# Patient Record
Sex: FEMALE
[De-identification: names, ages, dates, MRNs, and addresses within clinical notes are randomized per-mention and may not be internally consistent; named-entity substitution may affect disease eponyms.]

---

## 2017-05-23 ENCOUNTER — HOSPITAL ENCOUNTER (OUTPATIENT)
Dept: HOSPITAL 31 - C.ENDO | Age: 52
Discharge: HOME | End: 2017-05-23
Attending: SPECIALIST
Payer: COMMERCIAL

## 2017-05-23 VITALS
SYSTOLIC BLOOD PRESSURE: 131 MMHG | RESPIRATION RATE: 17 BRPM | DIASTOLIC BLOOD PRESSURE: 75 MMHG | OXYGEN SATURATION: 100 % | HEART RATE: 57 BPM

## 2017-05-23 VITALS — TEMPERATURE: 97.1 F

## 2017-05-23 VITALS — BODY MASS INDEX: 50.5 KG/M2

## 2017-05-23 DIAGNOSIS — K25.9: Primary | ICD-10-CM

## 2017-05-23 DIAGNOSIS — R10.13: ICD-10-CM

## 2017-05-23 DIAGNOSIS — Z88.0: ICD-10-CM

## 2017-05-23 DIAGNOSIS — K29.60: ICD-10-CM

## 2017-05-23 DIAGNOSIS — I10: ICD-10-CM

## 2017-05-23 PROCEDURE — 88305 TISSUE EXAM BY PATHOLOGIST: CPT

## 2017-05-23 PROCEDURE — 43239 EGD BIOPSY SINGLE/MULTIPLE: CPT

## 2017-05-23 PROCEDURE — 88342 IMHCHEM/IMCYTCHM 1ST ANTB: CPT

## 2018-01-15 ENCOUNTER — HOSPITAL ENCOUNTER (EMERGENCY)
Dept: HOSPITAL 42 - ED | Age: 53
Discharge: HOME | End: 2018-01-15
Payer: COMMERCIAL

## 2018-01-15 VITALS — SYSTOLIC BLOOD PRESSURE: 158 MMHG | DIASTOLIC BLOOD PRESSURE: 94 MMHG | HEART RATE: 61 BPM

## 2018-01-15 VITALS — RESPIRATION RATE: 18 BRPM

## 2018-01-15 VITALS — TEMPERATURE: 97.6 F | OXYGEN SATURATION: 100 %

## 2018-01-15 VITALS — BODY MASS INDEX: 50.5 KG/M2

## 2018-01-15 DIAGNOSIS — I25.2: ICD-10-CM

## 2018-01-15 DIAGNOSIS — Z87.891: ICD-10-CM

## 2018-01-15 DIAGNOSIS — I10: ICD-10-CM

## 2018-01-15 DIAGNOSIS — K21.9: ICD-10-CM

## 2018-01-15 DIAGNOSIS — R07.9: Primary | ICD-10-CM

## 2018-01-15 LAB
ALBUMIN SERPL-MCNC: 3.8 G/DL (ref 3–4.8)
ALBUMIN/GLOB SERPL: 1.1 {RATIO} (ref 1.1–1.8)
ALT SERPL-CCNC: 50 U/L (ref 7–56)
AST SERPL-CCNC: 33 U/L (ref 14–36)
BASOPHILS # BLD AUTO: 0.04 K/MM3 (ref 0–2)
BASOPHILS NFR BLD: 0.6 % (ref 0–3)
BNP SERPL-MCNC: 44.3 PG/ML (ref 0–450)
BUN SERPL-MCNC: 16 MG/DL (ref 7–21)
CALCIUM SERPL-MCNC: 9.3 MG/DL (ref 8.4–10.5)
EOSINOPHIL # BLD: 0.2 10*3/UL (ref 0–0.7)
EOSINOPHIL NFR BLD: 2.8 % (ref 1.5–5)
ERYTHROCYTE [DISTWIDTH] IN BLOOD BY AUTOMATED COUNT: 14 % (ref 11.5–14.5)
GFR NON-AFRICAN AMERICAN: > 60
GRANULOCYTES # BLD: 4.56 10*3/UL (ref 1.4–6.5)
GRANULOCYTES NFR BLD: 62.7 % (ref 50–68)
HGB BLD-MCNC: 13.9 G/DL (ref 12–16)
LYMPHOCYTES # BLD: 1.9 10*3/UL (ref 1.2–3.4)
LYMPHOCYTES NFR BLD AUTO: 26.1 % (ref 22–35)
MCH RBC QN AUTO: 29.4 PG (ref 25–35)
MCHC RBC AUTO-ENTMCNC: 32.1 G/DL (ref 31–37)
MCV RBC AUTO: 91.7 FL (ref 80–105)
MONOCYTES # BLD AUTO: 0.6 10*3/UL (ref 0.1–0.6)
MONOCYTES NFR BLD: 7.8 % (ref 1–6)
PLATELET # BLD: 219 10^3/UL (ref 120–450)
PMV BLD AUTO: 10.5 FL (ref 7–11)
RBC # BLD AUTO: 4.72 10^6/UL (ref 3.5–6.1)
TROPONIN I SERPL-MCNC: < 0.01 NG/ML
TROPONIN I SERPL-MCNC: < 0.01 NG/ML
WBC # BLD AUTO: 7.3 10^3/UL (ref 4.5–11)

## 2018-01-15 NOTE — CARD
--------------- APPROVED REPORT --------------





EKG Measurement

Heart [id number]

FL [id number]

[id number]

[id number]

[id number]



<Conclusion>

Normal sinus rhythm

Anteroseptal MI, age unknown

Small q waves 3,F

## 2018-01-15 NOTE — RAD
HISTORY:

chest pain  



COMPARISON:

No prior. 



FINDINGS:



LUNGS:

No active pulmonary disease.



PLEURA:

No significant pleural effusion identified, no pneumothorax apparent.



CARDIOVASCULAR:

Mild cardiomegaly.  Minimal vascular congestion



OSSEOUS STRUCTURES:

No significant abnormalities.



VISUALIZED UPPER ABDOMEN:

Normal.



OTHER FINDINGS:

None.



IMPRESSION:

Minimal vascular congestion

## 2018-01-15 NOTE — ED PDOC
Arrival/HPI





<Huber Perez - Last Filed: 01/15/18 15:12>





- General


Historian: Patient





- History of Present Illness


Symptom Onset: Sudden


Symptom Course: Resolved





<Vilma Rodriguez - Last Filed: 01/15/18 15:25>





- General


Time Seen by Provider: 01/15/18 09:56





- History of Present Illness


Narrative History of Present Illness (Text): 





01/15/18 10:02


52F presents from a rapid response in the hospital to the ER for chest pain at 

the [facility]. Patient states [place] [facility] [place] her heart rate was in the 30s due to fear of her decompensating further 

during biopsy of her uterine lining. Patient states she started feeling 

pressure in her chest on chest bilaterally. Patient states she's been feeling 

more fatigue within the past few weeks and tries to avoid walking now. Patient 

states she sleeps with a lot of pillows. Patient sees Dr. Sanchez and said she had 

about a 50-60% occlusion in her last exam, but it has been awhile since she has 

been re-evaluated. Patient was told to keep nitroglycerin with her when she 

experiences chest pain. Patient 





Patient admits to dysuria, polydipsia, no hematuria





Family history: mother and father  of heart attack, brother  at 25 of MI

, neice  at 3 years old. Mother's side of family has heart disease history 

and history of cancers (leukemia, ovarian)


PMH: CAD 


PSH: skin biopsies and recent biopsy of uterus


Social history: patient is a former smoker, no alcohol, no illicit drugs


Allergies: Penicillins, shrimp, roaches, trees


01/15/18 10:43





01/15/18 12:41


 (Vilma Rodriguez)





Past Medical History





- Tetanus Immunization


Tetanus Immunization: Unknown





- Cardiac


Hx Cardiac Disorders: Yes


Hx Circulatory Problems: Yes (" 1 CARDIAC BLOCKAGE POST ANGIOGRAM; NO 

INTERVENTION )


Hx MI: Yes (AGE 37 DUE TO HIGH BP)


Hx Hypertension: Yes





- Pulmonary


Hx Respiratory Disorders: Yes


Hx Asthma: Yes


Hx Chronic Obstructive Pulmonary Disease (COPD): Yes


Hx Sleep Apnea: Yes (USES C PAP)





- Neurological


Hx Neurological Disorder: Yes


Hx Dizziness: Yes


Hx Migraine: Yes





- HEENT


Hx HEENT Disorder: No





- Renal


Hx Renal Disorder: No





- Endocrine/Metabolic


Hx Endocrine Disorders: No





- Hematological/Oncological


Hx Blood Disorders: No





- Integumentary


Hx Dermatological Disorder: No





- Musculoskeletal/Rheumatological


Hx Musculoskeletal Disorders: Yes


Hx Arthritis: Yes


Hx Back Pain: Yes





- Gastrointestinal


Hx Gastrointestinal Disorders: Yes


Hx Fatty Liver Disease: Yes


Hx Gastritis: Yes


Hx Gastroesophageal Reflux: Yes





- Genitourinary/Gynecological


Hx Genitourinary Disorders: No





- Psychiatric


Hx Psychophysiologic Disorder: Yes


Hx Anxiety: Yes





- Surgical History


Hx Angiogram: Yes (2X)


Hx Tubal Ligation: Yes


Other/Comment: REMOVAL OF LIPOMA BACK AND WAIST





- Anesthesia


Hx Anesthesia: Yes


Hx Anesthesia Reactions: No


Hx Malignant Hyperthermia: No





- Suicidal Assessment


Feels Threatened In Home Enviroment: No





<Vilma Rodriguez - Last Filed: 01/15/18 15:25>





Family/Social History





- Physician Review


Nursing Documentation Reviewed: Yes


Smoking Status: Former Smoker


Hx Alcohol Use: No


Hx Substance Use Treatment: No





<Huber Perez - Last Filed: 01/15/18 15:12>


Family/Social History: CAD/MI, Neoplasm/Cancer


Smoking Status: Former Smoker


Hx Alcohol Use: No


Hx Substance Use Treatment: No





<Vilma Rodriguez - Last Filed: 01/15/18 15:25>


Narrative Family History (Free Text): 





01/15/18 10:22


father, mother and brother  of MI


Mother's side of family has cancer (leukemia, ovarian) (Vilma Rodriguez)





Allergies/Home Meds





<Huber Perez - Last Filed: 01/15/18 15:12>





<Vilma Rodriguez - Last Filed: 01/15/18 15:25>


Allergies/Adverse Reactions: 


Allergies





Penicillins Allergy (Severe, Verified 01/15/18 09:54)


 SHORTNESS OF BREATH


shrimp Allergy (Severe, Verified 01/15/18 09:54)


 SHORTNESS OF BREATH


ROACHES Allergy (Intermediate, Uncoded 01/15/18 09:54)


 ITCHING


TREES Allergy (Intermediate, Uncoded 01/15/18 09:54)


 ITCHING








Home Medications: 


 Home Meds











 Medication  Instructions  Recorded  Confirmed


 


Albuterol HFA [Ventolin HFA 90 2 puff IH BID PRN 09/20/16 01/15/18





mcg/actuation (8 g)]   


 


Furosemide [Lasix] 20 mg PO DAILY 09/20/16 01/15/18


 


Losartan/Hydrochlorothiazide 1 each PO DAILY 09/20/16 01/15/18





[Losartan-Hctz 100-25 mg Tab]   


 


Metoprolol Tartrate [Lopressor] 50 mg PO DAILY 09/20/16 01/15/18


 


Naproxen 500 mg PO BID 09/20/16 01/15/18


 


Warfarin Sodium [Jantoven] 5 mg PO DAILY 09/20/16 01/15/18














Review of Systems





- Physician Review


All systems were reviewed & negative as marked: Yes





- Review of Systems


Constitutional: Fatigue.  absent: Weight Change, Fevers


Eyes: absent: Vision Changes, Photophobia


ENT: absent: Hearing Changes, Rhinorrhea, Epistaxis


Respiratory: absent: SOB, Cough, Sputum


Cardiovascular: Orthopnea, Other (chest pressure)


Gastrointestinal: Normal.  absent: Constipation, Diarrhea


Genitourinary Female: Frequency.  absent: Hematuria, Vaginal Bleeding


Musculoskeletal: Arthralgias.  absent: Back Pain, Neck Pain


Skin: Other (spider bit wound on dorsum of right foot)


Neurological: absent: Headache, Dizziness, Speech Changes, Facial Droop


Endocrine: Other (denies having period for 5 years)


Hemo/Lymphatic: Adenopathy


Psychiatric: Normal.  absent: Anxiety, Depression





<Eng,Vilma - Last Filed: 01/15/18 15:25>





Physical Exam


Temperature: Afebrile


Blood Pressure: Hypertensive


Pulse: Bradycardic


Respiratory Rate: Normal


Appearance: Positive for: Non-Toxic, Other (lethargic)





- Systems Exam


Head: Present: Atraumatic, Normocephalic


Pupils: Present: PERRL


Extroacular Muscles: Present: EOMI


Conjunctiva: Present: Normal


Mouth: Present: Moist Mucous Membranes.  No: Dry, Drooling


Nose (External): Present: Atraumatic.  No: Abrasion


Nose (Internal): Present: Normal Inspection.  No: No Active Bleeding, Rhinorrhea


Neck: Present: Normal Range of Motion, Trachea Midline.  No: JVD


Respiratory/Chest: Present: Clear to Auscultation, Good Air Exchange.  No: 

Respiratory Distress


Cardiovascular: Present: Regular Rate and Rhythm, Normal S1, S2


Abdomen: Present: Distention.  No: Rebound, Guarding


Upper Extremity: Present: Capillary Refill < 2s.  No: Edema


Lower Extremity: Present: Edema (1+ trace pitting edema), Other (right lower 

extremity in boot. patient had a spider bite that hasn't healed).  No: CALF 

TENDERNESS


Neurological: Present: GCS=15, Speech Normal


Skin: Present: Warm, Dry, Normal Color.  No: Rashes


Psychiatric: Present: Alert, Oriented x 3





<Vilma Rodriguez - Last Filed: 01/15/18 15:25>


Vital Signs











  Temp Pulse Resp BP Pulse Ox


 


 01/15/18 12:53   61  18  158/94 H  100


 


 01/15/18 11:58   58 L  18  148/89  100


 


 01/15/18 10:06  97.6 F  57 L  16  156/90 H  100














Medical Decision Making





- RAD Interpretation


: ED Physician





<Huber Perez - Last Filed: 01/15/18 15:12>


Re-evaluation Time: 15:00


Reassessment Condition: Re-examined, Improved





- EKG Interpretation


Type: 12 lead EKG





<Vilma Rodriguez - Last Filed: 01/15/18 15:25>


ED Course and Treatment: 








01/15/18 10:44


Seen and examined with the resident. Our history and physical exam reveals a 

morbidly obese  female who complains of chest pain while in the wound 

Center this morning. She is currently comfortable. She reports a cardiac 

catheterization at another hospital 3 years ago. Unclear of the findings. She 

had a uterine biopsy last week under general anesthesia. She quit smoking 20 

years ago. No dyspnea. No nausea or vomiting.


01/15/18 12:24


Patient remains pain free. Plan will be to repeat a second set of cardiac 

enzymes and if troponin is negative to discharge patient home. Patient and 

daughter are aware of and agrees with plan.


01/15/18 12:42


EKG shows normal sinus rhythm rate approximately 60 with poor R waves and no 

acute ST or T-wave changes


01/15/18 15:13


Second troponin is negative. Patient will be discharged home accompanied by her 

daughter to follow up with her PMD. Follow-up in the ER as needed. Continue 

daily baby aspirin. (Huber Perez)





ACS/CHF


BNP


EKG


Echo


Troponin I


Cardio Consult: Dr. Sanchez





urinary problems


UA





spider bite wound that hasn't healed


Wound care (Vilma Rodriguez)





- Lab Interpretations


Lab Results: 








 01/15/18 10:30 





 01/15/18 10:30 





 Lab Results





01/15/18 14:30: Lactate Dehydrogenase 547, Total Creatine Kinase 63, Troponin I 

< 0.01


01/15/18 10:30: WBC 7.3, RBC 4.72, Hgb 13.9, Hct 43.3, MCV 91.7, MCH 29.4, MCHC 

32.1, RDW 14.0, Plt Count 219, MPV 10.5, Gran % 62.7, Lymph % (Auto) 26.1, Mono 

% (Auto) 7.8 H, Eos % (Auto) 2.8, Baso % (Auto) 0.6, Gran # 4.56, Lymph # 1.9, 

Mono # 0.6, Eos # 0.2, Baso # 0.04


01/15/18 10:30: Sodium 139, Potassium 4.5, Chloride 104, Carbon Dioxide 27, 

Anion Gap 12, BUN 16, Creatinine 0.7, Est GFR (African Amer) > 60, Est GFR (Non-

Af Amer) > 60, Random Glucose 109, Calcium 9.3, Total Bilirubin 0.6, AST 33, 

ALT 50, Alkaline Phosphatase 100, Lactate Dehydrogenase 577, Total Creatine 

Kinase 65, Troponin I < 0.01, NT-Pro-B Natriuret Pep 44.3, Total Protein 7.2, 

Albumin 3.8, Globulin 3.4, Albumin/Globulin Ratio 1.1











- RAD Interpretation


Radiology Orders: 








01/15/18 10:37


CHEST PORTABLE [RAD] Stat 








Chest one view shows borderline cardiomegaly and increased markings with no 

infiltrate and no effusion (Huber Perez)





Disposition/Present on Arrival





- Present on Arrival


Any Indicators Present on Arrival: No


History of DVT/PE: No


History of Uncontrolled Diabetes: No


Urinary Catheter: No


History of Decub. Ulcer: No





- Disposition


Have Diagnosis and Disposition been Completed?: Yes


Disposition Time: 15:14


Patient Plan: Discharge





<Huber Perez - Last Filed: 01/15/18 15:12>





- Present on Arrival


Any Indicators Present on Arrival: No


History of DVT/PE: No


History of Uncontrolled Diabetes: No


Urinary Catheter: No


History Surgical Site Infection Followin





- Disposition


Have Diagnosis and Disposition been Completed?: Yes





<Vilma Rodriguez - Last Filed: 01/15/18 15:25>





- Disposition


Diagnosis: 


 Chest pain





Disposition: HOME/ ROUTINE


Patient Problems: 


 Current Active Problems











Problem Status Onset


 


CAD (coronary artery disease) Acute  


 


Chest pain Acute  











Condition: GOOD


Discharge Instructions (ExitCare):  Chest Pain (ED)


Additional Instructions: 


Daily baby aspirin. Follow-up with PMD. Follow-up in the ER as needed.


Referrals: 


Zeb Reagan MD [Primary Care Provider] - Follow up with primary

## 2018-02-08 ENCOUNTER — HOSPITAL ENCOUNTER (OUTPATIENT)
Dept: HOSPITAL 31 - C.ENDO | Age: 53
Discharge: HOME | End: 2018-02-08
Attending: SPECIALIST
Payer: COMMERCIAL

## 2018-02-08 VITALS — RESPIRATION RATE: 16 BRPM

## 2018-02-08 VITALS — SYSTOLIC BLOOD PRESSURE: 129 MMHG | DIASTOLIC BLOOD PRESSURE: 77 MMHG | HEART RATE: 68 BPM

## 2018-02-08 VITALS — OXYGEN SATURATION: 100 %

## 2018-02-08 VITALS — BODY MASS INDEX: 50.5 KG/M2

## 2018-02-08 VITALS — TEMPERATURE: 97 F

## 2018-02-08 DIAGNOSIS — R10.13: ICD-10-CM

## 2018-02-08 DIAGNOSIS — K25.9: ICD-10-CM

## 2018-02-08 DIAGNOSIS — K44.9: ICD-10-CM

## 2018-02-08 DIAGNOSIS — K29.60: Primary | ICD-10-CM

## 2018-02-08 DIAGNOSIS — R11.2: ICD-10-CM

## 2018-02-08 PROCEDURE — 43239 EGD BIOPSY SINGLE/MULTIPLE: CPT

## 2018-02-08 PROCEDURE — 88305 TISSUE EXAM BY PATHOLOGIST: CPT

## 2018-02-13 ENCOUNTER — HOSPITAL ENCOUNTER (OUTPATIENT)
Dept: HOSPITAL 31 - C.ENDO | Age: 53
Discharge: HOME | End: 2018-02-13
Attending: SPECIALIST
Payer: COMMERCIAL

## 2018-02-13 VITALS — TEMPERATURE: 97.2 F

## 2018-02-13 VITALS — RESPIRATION RATE: 18 BRPM

## 2018-02-13 VITALS — BODY MASS INDEX: 50.5 KG/M2

## 2018-02-13 VITALS — OXYGEN SATURATION: 100 %

## 2018-02-13 VITALS — SYSTOLIC BLOOD PRESSURE: 150 MMHG | DIASTOLIC BLOOD PRESSURE: 74 MMHG | HEART RATE: 53 BPM

## 2018-02-13 DIAGNOSIS — E11.9: ICD-10-CM

## 2018-02-13 DIAGNOSIS — I25.10: ICD-10-CM

## 2018-02-13 DIAGNOSIS — K52.9: ICD-10-CM

## 2018-02-13 DIAGNOSIS — K64.8: ICD-10-CM

## 2018-02-13 DIAGNOSIS — I10: ICD-10-CM

## 2018-02-13 DIAGNOSIS — Z88.0: ICD-10-CM

## 2018-02-13 DIAGNOSIS — J45.909: ICD-10-CM

## 2018-02-13 DIAGNOSIS — K58.9: Primary | ICD-10-CM

## 2018-02-13 DIAGNOSIS — K64.4: ICD-10-CM

## 2018-02-13 PROCEDURE — 88305 TISSUE EXAM BY PATHOLOGIST: CPT

## 2018-02-13 PROCEDURE — 45378 DIAGNOSTIC COLONOSCOPY: CPT

## 2018-08-07 NOTE — PCM.RRT
<JessieBrett - Last Filed: 01/15/18 10:02>





RRT Nurse Assessment





- Situation


Date: 01/15/18


Time RRT was called: 09:27


RRT Responder Arrival Time: 09:29


RRT Location:: [facility]


RRT Reason for Call: Chest Pain





- Respiratory


Oxygen Delivery Method: Nasal Cannula @L/min (2L)





I.Reason for RRT





- A) Acute Change in Patient:


(Select all that apply): Chest Pain


Subjective: 





House Physician Resident


Brett Roman, PGY-2 IM





Rapid response called at 0927, responded immediately.  On arrival to [facility], staff reports patient acutely developed left sided chest pain, 

shortness of breath, and complained of sensation of "elephant sitting on my 

chest."  Portable monitor leads were attached to patient, no ST-elevations 

evident on monitor.  Patient vitals indicated hemodynamic stability, satting 

well on room air, and not grossly tachycardic or bradycardic.  Patient was 

transferred to stretcher, placed on 2L NC O2, and transferred to ED via 

stretcher with portable monitor still attached.  Stable throughout transport, 

and pt reported some improvement in chest pain during transit.  Upon arrival in 

ED, signed out to attending ED physician and ED resident.  Patient admitted to 

ED for EKG, trops, and additional workup to rule out acute coronary syndrome.





Patient seen, reviewed, and discussed with hospitalist attending responding to 

rapid response, Dr. Blair.





- Neurological Status


(Select all that apply): Alert





- Respiratory


Oxygen Delivery Method: Nasal Cannula @L/min (2L)





- Constitutional


Appears: Well, Non-toxic, In Acute Distress (chest pain and uncomfotable 

appearing)





- Head


Head Exam: ATRAUMATIC, NORMAL INSPECTION, NORMOCEPHALIC





- Eyes


Eye Exam: Normal appearance.  absent: Conjunctival injection, Scleral icterus





- Respiratory Exam


Respiratory Exam: NORMAL BREATHING PATTERN.  absent: Accessory Muscle Use, 

Decreased Breath Sounds, Respiratory Distress





- Cardiovascular Exam


Cardiovascular Exam: REGULAR RHYTHM, RRR.  absent: Bradycardia, Tachycardia, 

Irregular Rhythm


Additional comments: 





No ST-elevations on portable monitor





- GI/Abdominal Exam


GI & Abdominal Exam: absent: Distended (obese but not distended)





- Neurological Exam


Neurological Exam: Alert, Awake





- Extremities Exam


Extremities Exam: Full ROM


Additional comments: 





transferred from wound center chair to stretcher without overt gait abnormality

, used 2-person assist





Plan





- Assessment of Findings&Treatment Plan





Hemodynamically stable, satting well on room air, stable for transfer to ED for 

further ACS workup, including EKG and trops


Signed out to ED attending physician and ED resident.





<Den Blair - Last Filed: 01/15/18 15:41>





Attending/Attestation





- Attestation


I have personally seen and examined this patient.: Yes


I have fully participated in the care of the patient.: Yes


I have reviewed all pertinent clinical information, including history, physical 

exam and plan: Yes


Notes (Text): 





01/15/18 15:39


attending note;





Patient seen and examined with resident during rapid response.





patient is a 52-year-old [facility] developed chest pain.


 Patient  is hemodynamically stable.


Transfer to the ER for further  work up.


Signed out to ER attending Dr. Hall.





the patient will get EKG, cardiac enzymes, CBC CMP in the ER. blood in urine/urinary symptoms

## 2018-10-11 ENCOUNTER — HOSPITAL ENCOUNTER (OUTPATIENT)
Dept: HOSPITAL 31 - C.ENDO | Age: 53
Discharge: HOME | End: 2018-10-11
Attending: SPECIALIST
Payer: COMMERCIAL

## 2018-10-11 VITALS — HEART RATE: 58 BPM | RESPIRATION RATE: 15 BRPM

## 2018-10-11 VITALS — SYSTOLIC BLOOD PRESSURE: 127 MMHG | DIASTOLIC BLOOD PRESSURE: 69 MMHG

## 2018-10-11 VITALS — TEMPERATURE: 97 F | OXYGEN SATURATION: 100 %

## 2018-10-11 VITALS — BODY MASS INDEX: 54.6 KG/M2

## 2018-10-11 DIAGNOSIS — I25.10: ICD-10-CM

## 2018-10-11 DIAGNOSIS — K31.89: ICD-10-CM

## 2018-10-11 DIAGNOSIS — I10: ICD-10-CM

## 2018-10-11 DIAGNOSIS — K25.9: Primary | ICD-10-CM

## 2018-10-11 DIAGNOSIS — J45.909: ICD-10-CM

## 2018-10-11 DIAGNOSIS — Z88.0: ICD-10-CM

## 2018-10-11 DIAGNOSIS — Z91.048: ICD-10-CM

## 2018-10-11 DIAGNOSIS — E11.9: ICD-10-CM

## 2018-10-11 DIAGNOSIS — K44.9: ICD-10-CM

## 2018-10-11 DIAGNOSIS — Z91.013: ICD-10-CM

## 2018-10-11 PROCEDURE — 43239 EGD BIOPSY SINGLE/MULTIPLE: CPT

## 2018-10-11 PROCEDURE — 88305 TISSUE EXAM BY PATHOLOGIST: CPT
